# Patient Record
Sex: MALE | Race: ASIAN | NOT HISPANIC OR LATINO
[De-identification: names, ages, dates, MRNs, and addresses within clinical notes are randomized per-mention and may not be internally consistent; named-entity substitution may affect disease eponyms.]

---

## 2020-09-09 ENCOUNTER — APPOINTMENT (OUTPATIENT)
Dept: ORTHOPEDIC SURGERY | Facility: CLINIC | Age: 55
End: 2020-09-09
Payer: COMMERCIAL

## 2020-09-09 VITALS — BODY MASS INDEX: 26.6 KG/M2 | RESPIRATION RATE: 16 BRPM | WEIGHT: 190 LBS | HEIGHT: 71 IN

## 2020-09-09 DIAGNOSIS — Z86.79 PERSONAL HISTORY OF OTHER DISEASES OF THE CIRCULATORY SYSTEM: ICD-10-CM

## 2020-09-09 DIAGNOSIS — Z87.898 PERSONAL HISTORY OF OTHER SPECIFIED CONDITIONS: ICD-10-CM

## 2020-09-09 DIAGNOSIS — M75.22 BICIPITAL TENDINITIS, LEFT SHOULDER: ICD-10-CM

## 2020-09-09 DIAGNOSIS — Z87.39 PERSONAL HISTORY OF OTHER DISEASES OF THE MUSCULOSKELETAL SYSTEM AND CONNECTIVE TISSUE: ICD-10-CM

## 2020-09-09 DIAGNOSIS — Z87.891 PERSONAL HISTORY OF NICOTINE DEPENDENCE: ICD-10-CM

## 2020-09-09 DIAGNOSIS — M77.12 LATERAL EPICONDYLITIS, LEFT ELBOW: ICD-10-CM

## 2020-09-09 PROCEDURE — 99203 OFFICE O/P NEW LOW 30 MIN: CPT

## 2020-09-09 PROCEDURE — 73070 X-RAY EXAM OF ELBOW: CPT | Mod: LT

## 2020-09-09 RX ORDER — CHLORTHALIDONE 25 MG/1
25 TABLET ORAL
Refills: 0 | Status: ACTIVE | COMMUNITY

## 2020-09-09 RX ORDER — ATENOLOL 50 MG/1
TABLET ORAL
Refills: 0 | Status: ACTIVE | COMMUNITY

## 2020-09-09 RX ORDER — LOSARTAN POTASSIUM 100 MG/1
100 TABLET, FILM COATED ORAL
Refills: 0 | Status: ACTIVE | COMMUNITY

## 2020-11-05 ENCOUNTER — OUTPATIENT (OUTPATIENT)
Dept: OUTPATIENT SERVICES | Facility: HOSPITAL | Age: 55
LOS: 1 days | End: 2020-11-05
Payer: COMMERCIAL

## 2020-11-05 DIAGNOSIS — I25.10 ATHEROSCLEROTIC HEART DISEASE OF NATIVE CORONARY ARTERY WITHOUT ANGINA PECTORIS: ICD-10-CM

## 2020-11-05 PROCEDURE — 78452 HT MUSCLE IMAGE SPECT MULT: CPT | Mod: 26

## 2020-11-05 PROCEDURE — 93016 CV STRESS TEST SUPVJ ONLY: CPT

## 2020-11-05 PROCEDURE — 78452 HT MUSCLE IMAGE SPECT MULT: CPT

## 2020-11-05 PROCEDURE — A9500: CPT

## 2020-11-05 PROCEDURE — 93018 CV STRESS TEST I&R ONLY: CPT

## 2020-11-05 PROCEDURE — 93017 CV STRESS TEST TRACING ONLY: CPT

## 2020-11-05 PROCEDURE — A9505: CPT

## 2021-03-02 ENCOUNTER — APPOINTMENT (OUTPATIENT)
Dept: ENDOCRINOLOGY | Facility: CLINIC | Age: 56
End: 2021-03-02
Payer: COMMERCIAL

## 2021-03-02 VITALS
WEIGHT: 194 LBS | BODY MASS INDEX: 27.16 KG/M2 | DIASTOLIC BLOOD PRESSURE: 88 MMHG | HEART RATE: 59 BPM | SYSTOLIC BLOOD PRESSURE: 140 MMHG | HEIGHT: 71 IN

## 2021-03-02 DIAGNOSIS — E11.9 TYPE 2 DIABETES MELLITUS W/OUT COMPLICATIONS: ICD-10-CM

## 2021-03-02 PROCEDURE — 99072 ADDL SUPL MATRL&STAF TM PHE: CPT

## 2021-03-02 PROCEDURE — 99204 OFFICE O/P NEW MOD 45 MIN: CPT

## 2021-03-02 RX ORDER — CLOPIDOGREL 75 MG/1
TABLET, FILM COATED ORAL
Refills: 0 | Status: DISCONTINUED | COMMUNITY
End: 2021-03-02

## 2021-03-03 RX ORDER — ATORVASTATIN CALCIUM 40 MG/1
40 TABLET, FILM COATED ORAL
Refills: 0 | Status: DISCONTINUED | COMMUNITY
End: 2021-03-03

## 2021-03-03 RX ORDER — ATORVASTATIN CALCIUM 80 MG/1
80 TABLET, FILM COATED ORAL
Qty: 90 | Refills: 0 | Status: ACTIVE | COMMUNITY
Start: 2021-02-23

## 2021-03-03 RX ORDER — POTASSIUM CHLORIDE 1500 MG/1
20 TABLET, EXTENDED RELEASE ORAL
Qty: 90 | Refills: 0 | Status: ACTIVE | COMMUNITY
Start: 2021-01-11

## 2021-03-03 NOTE — REVIEW OF SYSTEMS
[Fatigue] : fatigue [Blurred Vision] : blurred vision [Nausea] : nausea [Pain/Numbness of Digits] : pain/numbness of digits [Polydipsia] : polydipsia [All other systems negative] : All other systems negative

## 2021-03-03 NOTE — ASSESSMENT
[FreeTextEntry1] : Diabetes, new diagnosis.  goal A1c < 7% (or < 6.5 would be even better)\par Potential complications of diabetes reviewed (blindness, kidney disease, nerve damage) and importance of glucose control discussed to prevent complications. Explained reduced beta cell reserve with diabetes and importance of lifestyle and glucose control in preserving beta cell function. Limit carb portions to one type of carb per meal and limit portion to half cup.  Carb guide given; if carb counting, then limit meals to 45g per meal. Avoid  juice or sugared beverages.  Verio meter given and demonstrated: test glucose once/day, alternating between fasting (am goal < 130) and bedtime/post dinner (goal < 170). Foot care discussed. \par Exercise goal 30 min/day.\par Start metformin 500mg bid, take with food \par Limit alcohol to two servings per week.\par ophtho referral given\par RTO 3 months\par

## 2021-03-03 NOTE — PHYSICAL EXAM
[Alert] : alert [Healthy Appearance] : healthy appearance [No Proptosis] : no proptosis [No Lid Lag] : no lid lag [Normal Hearing] : hearing was normal [No LAD] : no lymphadenopathy [Thyroid Not Enlarged] : the thyroid was not enlarged [Clear to Auscultation] : lungs were clear to auscultation bilaterally [Normal S1, S2] : normal S1 and S2 [Regular Rhythm] : with a regular rhythm [No Edema] : no peripheral edema [Pedal Pulses Normal] : the pedal pulses are present [Normal Sensation on Monofilament Testing] : normal sensation on monofilament testing of lower extremities [Normal Affect] : the affect was normal [Normal Mood] : the mood was normal [Foot Ulcers] : no foot ulcers [de-identified] : fingerstick 241, 4h pp (watermelon, grapes, banana and cereal, no milk) [de-identified] : no onychomycoses

## 2021-03-03 NOTE — CONSULT LETTER
[Dear  ___] : Dear  [unfilled], [Consult Letter:] : I had the pleasure of evaluating your patient, [unfilled]. [Please see my note below.] : Please see my note below. [Consult Closing:] : Thank you very much for allowing me to participate in the care of this patient.  If you have any questions, please do not hesitate to contact me. [Sincerely,] : Sincerely, [FreeTextEntry1] : Mr. Arrieta has new diagnosis of diabetes, precipitated by worsening lifestyle choices over the holidays. I recommended starting metformin 500mg bid, along with lifestyle modification to get his A1c to goal.  Since he is early in his diabetes diagnosis, I would like to aim for A1c < 6.5%, if possible.  I explained that he may need two agents for diabetes but since his lifestyle has room for improvement, I only started metformin for now, but if A1c is still  over 7.5% at next visit, will recommend another agent, probably GLP1 agonist or SGLT2 inhibitor, since these also have been shown to have cardiac benefit.\par  [FreeTextEntry3] : Melissa Toledo MD\par Division of Endocrinology\par [facility] Physician [facility]

## 2021-03-03 NOTE — REASON FOR VISIT
[Initial Evaluation] : an initial evaluation [DM Type 2] : DM Type 2 [FreeTextEntry2] : Dr Bacilio Rasmussen

## 2021-03-03 NOTE — HISTORY OF PRESENT ILLNESS
[FreeTextEntry1] : Last A1c was 8.0%.  In the summer, A1c was 6.3% and he was told that this was elevated, and after hard work and exercise, A1c reduced to 5.9% in the fall.   But then he went to [place] for the holidays, ate more dessert, less opportunity to exercise, and now A1c is 8%.  Mother has diabetes (diagnosed when she was 62), and CHF. \par Recently has been having dry mouth, alexandra in the mornings, and blurred vision intermittently and reduced concentration.\par After drinking alcohol, the next morning has nausea and cold sweats.  Some mornings feels burning feeling or clammy.\par no SOB or chest pain.\par \par PMH: CAD s/p stent 11/2019 (found after experiencing chest pain radiating to L arm while walking to work)\par \par Meds:\par losartan 100mg, chlorthalidone 25mg, atenolol 12.5mg\par Klorcon\par atorvastatin 40mg (? or 80)\par aspirin 81mg

## 2021-03-15 ENCOUNTER — APPOINTMENT (OUTPATIENT)
Dept: OPHTHALMOLOGY | Facility: CLINIC | Age: 56
End: 2021-03-15
Payer: COMMERCIAL

## 2021-03-15 ENCOUNTER — NON-APPOINTMENT (OUTPATIENT)
Age: 56
End: 2021-03-15

## 2021-03-15 PROCEDURE — 92004 COMPRE OPH EXAM NEW PT 1/>: CPT

## 2021-03-15 PROCEDURE — 99072 ADDL SUPL MATRL&STAF TM PHE: CPT

## 2021-03-15 PROCEDURE — 92134 CPTRZ OPH DX IMG PST SGM RTA: CPT

## 2021-03-15 PROCEDURE — 92201 OPSCPY EXTND RTA DRAW UNI/BI: CPT

## 2021-03-30 ENCOUNTER — NON-APPOINTMENT (OUTPATIENT)
Age: 56
End: 2021-03-30

## 2021-05-07 ENCOUNTER — NON-APPOINTMENT (OUTPATIENT)
Age: 56
End: 2021-05-07

## 2021-05-07 ENCOUNTER — APPOINTMENT (OUTPATIENT)
Dept: OPHTHALMOLOGY | Facility: CLINIC | Age: 56
End: 2021-05-07
Payer: SELF-PAY

## 2021-05-07 PROCEDURE — 92015 DETERMINE REFRACTIVE STATE: CPT

## 2021-06-08 ENCOUNTER — LABORATORY RESULT (OUTPATIENT)
Age: 56
End: 2021-06-08

## 2021-06-08 ENCOUNTER — APPOINTMENT (OUTPATIENT)
Dept: ENDOCRINOLOGY | Facility: CLINIC | Age: 56
End: 2021-06-08
Payer: COMMERCIAL

## 2021-06-08 VITALS
BODY MASS INDEX: 26.08 KG/M2 | HEART RATE: 64 BPM | DIASTOLIC BLOOD PRESSURE: 84 MMHG | SYSTOLIC BLOOD PRESSURE: 128 MMHG | WEIGHT: 187 LBS

## 2021-06-08 PROCEDURE — 99214 OFFICE O/P EST MOD 30 MIN: CPT

## 2021-06-08 PROCEDURE — 99072 ADDL SUPL MATRL&STAF TM PHE: CPT

## 2021-06-08 NOTE — ASSESSMENT
[FreeTextEntry1] : Diabetes, sugars improved.  goal A1c < 7% (or < 6.5 would be even better).  Mixed hyperlipidemia.\par Check A1c today, if still over 7.5%, will either increase metformin or add SGLT2 inhibitor, which also has cardiac benefits, and he has CAD.\par Continue lifestyle changes. OK to have dried fruits, but should be mindful of portion sizes.  Try to increase exercise to 5x/week\par continue statin therapy.  recheck lipids today (high trigs last visit).\par RTO 4  months\par

## 2021-06-08 NOTE — PHYSICAL EXAM
[Alert] : alert [Healthy Appearance] : healthy appearance [No Proptosis] : no proptosis [No Lid Lag] : no lid lag [Normal Hearing] : hearing was normal [No LAD] : no lymphadenopathy [Thyroid Not Enlarged] : the thyroid was not enlarged [Clear to Auscultation] : lungs were clear to auscultation bilaterally [Normal S1, S2] : normal S1 and S2 [Regular Rhythm] : with a regular rhythm [No Edema] : no peripheral edema [Pedal Pulses Normal] : the pedal pulses are present [Normal Sensation on Monofilament Testing] : normal sensation on monofilament testing of lower extremities [Normal Affect] : the affect was normal [Normal Mood] : the mood was normal [Foot Ulcers] : no foot ulcers [de-identified] : no onychomycoses

## 2021-06-08 NOTE — HISTORY OF PRESENT ILLNESS
[FreeTextEntry1] : glucose log reviewed:  testing once/day\par am  104 -160, but mostly 130-150 range\par hs:  155, 183, 126, 135, 206\par no polyuria, polydipsia, SOB, chest pain, or neuropathy symptoms \par saw ophtho, 3/21 and 5/21, no DR\par walking every day for errands, shopping, but has dedicated walk 2-3x/week\par eats an occasional pastry.  has dried fruit in the morning.  ate ice cream once, and then sugar was high the next morning.\par stopped drinking alcohol for a while, and recently had one drink\par weight is down 7lb since last visit\par fully vaccinated for Covid\par \par PMH: CAD s/p stent 11/2019 (found after experiencing chest pain radiating to L arm while walking to work)\par \par Meds:\par metformin 500mg bid\par losartan 100mg, chlorthalidone 25mg, atenolol 12.5mg\par Klorcon\par atorvastatin 40mg (? or 80)\par aspirin 81mg

## 2021-06-10 ENCOUNTER — TRANSCRIPTION ENCOUNTER (OUTPATIENT)
Age: 56
End: 2021-06-10

## 2021-08-23 ENCOUNTER — RX RENEWAL (OUTPATIENT)
Age: 56
End: 2021-08-23

## 2021-09-03 ENCOUNTER — RX RENEWAL (OUTPATIENT)
Age: 56
End: 2021-09-03

## 2021-10-07 ENCOUNTER — APPOINTMENT (OUTPATIENT)
Dept: ENDOCRINOLOGY | Facility: CLINIC | Age: 56
End: 2021-10-07
Payer: COMMERCIAL

## 2021-10-07 ENCOUNTER — APPOINTMENT (OUTPATIENT)
Dept: BARIATRICS | Facility: CLINIC | Age: 56
End: 2021-10-07
Payer: COMMERCIAL

## 2021-10-07 VITALS
SYSTOLIC BLOOD PRESSURE: 125 MMHG | BODY MASS INDEX: 25.66 KG/M2 | WEIGHT: 184 LBS | DIASTOLIC BLOOD PRESSURE: 74 MMHG | HEART RATE: 60 BPM

## 2021-10-07 VITALS
DIASTOLIC BLOOD PRESSURE: 84 MMHG | WEIGHT: 187 LBS | OXYGEN SATURATION: 97 % | BODY MASS INDEX: 26.18 KG/M2 | SYSTOLIC BLOOD PRESSURE: 134 MMHG | HEART RATE: 61 BPM | TEMPERATURE: 97.6 F | HEIGHT: 71 IN

## 2021-10-07 DIAGNOSIS — Z82.49 FAMILY HISTORY OF ISCHEMIC HEART DISEASE AND OTHER DISEASES OF THE CIRCULATORY SYSTEM: ICD-10-CM

## 2021-10-07 DIAGNOSIS — Z83.3 FAMILY HISTORY OF DIABETES MELLITUS: ICD-10-CM

## 2021-10-07 PROCEDURE — 99214 OFFICE O/P EST MOD 30 MIN: CPT

## 2021-10-07 PROCEDURE — 99204 OFFICE O/P NEW MOD 45 MIN: CPT

## 2021-10-07 RX ORDER — HYDROCORTISONE 1 %
12 CREAM (GRAM) TOPICAL
Qty: 1 | Refills: 3 | Status: ACTIVE | COMMUNITY
Start: 2021-10-07 | End: 1900-01-01

## 2021-10-07 NOTE — ASSESSMENT
[FreeTextEntry1] : Diabetes,  at  goal  (A1c < 7.0%)   \par Continue metformin monotherapy.  If A1c not at goal, can increase metformin or add SGLT2 inhibitor (since he has CAD also).\par OK to test glucose less frequently; I suggested testing 3x/week, but alternate times between fasting and bedtime (post dinner). \par Continue statin therapy.\par Try ammonium lactate for dry skin\par He will get labs with PCP later this month and forward to me.\par RTO 6 months.  If A1c still < 6.5%, can follow with PCP and return to me if A1c goes over 7.

## 2021-10-07 NOTE — HISTORY OF PRESENT ILLNESS
[FreeTextEntry1] : glucose log reviewed:  testing less frequently, mostly in the mornings\par am  115-161, mostly 135-150 range\par hs:  189, 1436\par no polyuria, polydipsia, SOB, chest pain, or neuropathy symptoms \par went to  for over a month in the summer, was off usual diet.   weight is down another 3 lb since last visit.\par main issue is dry skin on lower legs, feet. sometimes hands\par Was having some stomach upset, was diagnosed with umbilical hernia and referred to surgery\par fully vaccinated for Covid; declines flu vaccine\par up to date with ophtho, has follow up later this month\par \par PMH: CAD s/p stent 11/2019 (found after experiencing chest pain radiating to L arm while walking to work)\par \par Meds:\par metformin 500mg bid\par losartan 100mg, chlorthalidone 25mg, atenolol 12.5mg\par Klorcon\par atorvastatin 40mg (? or 80)\par aspirin 81mg

## 2021-10-07 NOTE — DATA REVIEWED
[FreeTextEntry1] : 6/21: A1c 5.7%, tot chol 115, trig 120, HDL 40, LDL 52,  B12 521, urine microalbumin < 1.2\par 2/21: A1c 8.0%, tot chol 122, HDL 32, LDL 54, trig 222, TSH 2.31, GFR 74

## 2021-10-07 NOTE — PHYSICAL EXAM
[Alert] : alert [Healthy Appearance] : healthy appearance [No Proptosis] : no proptosis [No Lid Lag] : no lid lag [Normal Hearing] : hearing was normal [No LAD] : no lymphadenopathy [Thyroid Not Enlarged] : the thyroid was not enlarged [Clear to Auscultation] : lungs were clear to auscultation bilaterally [Normal S1, S2] : normal S1 and S2 [Regular Rhythm] : with a regular rhythm [No Edema] : no peripheral edema [Pedal Pulses Normal] : the pedal pulses are present [Normal Sensation on Monofilament Testing] : normal sensation on monofilament testing of lower extremities [Normal Affect] : the affect was normal [Normal Mood] : the mood was normal [Foot Ulcers] : no foot ulcers [de-identified] : no onychomycoses

## 2021-10-12 PROBLEM — Z82.49 FAMILY HISTORY OF CARDIAC DISORDER: Status: ACTIVE | Noted: 2021-10-07

## 2021-10-12 PROBLEM — Z83.3 FAMILY HISTORY OF DIABETES MELLITUS: Status: ACTIVE | Noted: 2021-10-07

## 2021-10-12 PROBLEM — Z82.49 FAMILY HISTORY OF HYPERTENSION: Status: ACTIVE | Noted: 2021-10-07

## 2021-10-21 ENCOUNTER — OUTPATIENT (OUTPATIENT)
Dept: OUTPATIENT SERVICES | Facility: HOSPITAL | Age: 56
LOS: 1 days | End: 2021-10-21
Payer: COMMERCIAL

## 2021-10-21 PROCEDURE — 71046 X-RAY EXAM CHEST 2 VIEWS: CPT

## 2021-10-21 PROCEDURE — 71046 X-RAY EXAM CHEST 2 VIEWS: CPT | Mod: 26

## 2021-10-27 ENCOUNTER — APPOINTMENT (OUTPATIENT)
Dept: OPHTHALMOLOGY | Facility: CLINIC | Age: 56
End: 2021-10-27
Payer: COMMERCIAL

## 2021-10-27 ENCOUNTER — NON-APPOINTMENT (OUTPATIENT)
Age: 56
End: 2021-10-27

## 2021-10-27 PROCEDURE — 92250 FUNDUS PHOTOGRAPHY W/I&R: CPT

## 2021-10-27 PROCEDURE — 92014 COMPRE OPH EXAM EST PT 1/>: CPT

## 2021-11-04 ENCOUNTER — TRANSCRIPTION ENCOUNTER (OUTPATIENT)
Age: 56
End: 2021-11-04

## 2021-11-05 ENCOUNTER — OUTPATIENT (OUTPATIENT)
Dept: OUTPATIENT SERVICES | Facility: HOSPITAL | Age: 56
LOS: 1 days | Discharge: HOME | End: 2021-11-05
Payer: COMMERCIAL

## 2021-11-05 ENCOUNTER — APPOINTMENT (OUTPATIENT)
Dept: SURGERY | Facility: AMBULATORY SURGERY CENTER | Age: 56
End: 2021-11-05

## 2021-11-05 DIAGNOSIS — K46.9 UNSPECIFIED ABDOMINAL HERNIA W/OUT OBSTRUCTION OR GANGRENE: ICD-10-CM

## 2021-11-05 DIAGNOSIS — Z00.00 ENCOUNTER FOR GENERAL ADULT MEDICAL EXAMINATION W/OUT ABNORMAL FINDINGS: ICD-10-CM

## 2021-11-05 LAB — GLUCOSE BLDC GLUCOMTR-MCNC: 176 MG/DL — HIGH (ref 70–99)

## 2021-11-05 PROCEDURE — 49561: CPT

## 2021-11-05 PROCEDURE — 49568: CPT

## 2021-11-11 ENCOUNTER — APPOINTMENT (OUTPATIENT)
Dept: SURGERY | Facility: CLINIC | Age: 56
End: 2021-11-11

## 2021-11-11 VITALS
HEIGHT: 71 IN | HEART RATE: 67 BPM | SYSTOLIC BLOOD PRESSURE: 124 MMHG | WEIGHT: 187 LBS | OXYGEN SATURATION: 98 % | DIASTOLIC BLOOD PRESSURE: 78 MMHG | BODY MASS INDEX: 26.18 KG/M2 | TEMPERATURE: 97.6 F

## 2021-11-22 ENCOUNTER — NON-APPOINTMENT (OUTPATIENT)
Age: 56
End: 2021-11-22

## 2021-11-22 ENCOUNTER — APPOINTMENT (OUTPATIENT)
Dept: OPHTHALMOLOGY | Facility: CLINIC | Age: 56
End: 2021-11-22
Payer: COMMERCIAL

## 2021-11-22 PROCEDURE — 67105 REPAIR DETACHED RETINA PC: CPT | Mod: LT

## 2021-12-06 ENCOUNTER — NON-APPOINTMENT (OUTPATIENT)
Age: 56
End: 2021-12-06

## 2021-12-06 ENCOUNTER — APPOINTMENT (OUTPATIENT)
Dept: OPHTHALMOLOGY | Facility: CLINIC | Age: 56
End: 2021-12-06
Payer: COMMERCIAL

## 2021-12-06 PROCEDURE — 92012 INTRM OPH EXAM EST PATIENT: CPT

## 2021-12-06 PROCEDURE — 92201 OPSCPY EXTND RTA DRAW UNI/BI: CPT

## 2022-04-07 ENCOUNTER — APPOINTMENT (OUTPATIENT)
Dept: OPHTHALMOLOGY | Facility: CLINIC | Age: 57
End: 2022-04-07
Payer: COMMERCIAL

## 2022-04-07 ENCOUNTER — NON-APPOINTMENT (OUTPATIENT)
Age: 57
End: 2022-04-07

## 2022-04-07 PROCEDURE — 92250 FUNDUS PHOTOGRAPHY W/I&R: CPT

## 2022-04-07 PROCEDURE — 92014 COMPRE OPH EXAM EST PT 1/>: CPT

## 2022-05-17 ENCOUNTER — RX RENEWAL (OUTPATIENT)
Age: 57
End: 2022-05-17

## 2022-08-15 ENCOUNTER — RX RENEWAL (OUTPATIENT)
Age: 57
End: 2022-08-15

## 2022-08-26 ENCOUNTER — RX RENEWAL (OUTPATIENT)
Age: 57
End: 2022-08-26

## 2022-09-08 ENCOUNTER — APPOINTMENT (OUTPATIENT)
Dept: OPHTHALMOLOGY | Facility: CLINIC | Age: 57
End: 2022-09-08

## 2022-11-17 ENCOUNTER — RX RENEWAL (OUTPATIENT)
Age: 57
End: 2022-11-17

## 2022-12-08 ENCOUNTER — APPOINTMENT (OUTPATIENT)
Dept: PULMONOLOGY | Facility: CLINIC | Age: 57
End: 2022-12-08

## 2022-12-08 VITALS
HEART RATE: 67 BPM | OXYGEN SATURATION: 98 % | TEMPERATURE: 97.1 F | BODY MASS INDEX: 27.44 KG/M2 | DIASTOLIC BLOOD PRESSURE: 79 MMHG | SYSTOLIC BLOOD PRESSURE: 123 MMHG | WEIGHT: 196 LBS | HEIGHT: 71 IN

## 2022-12-08 DIAGNOSIS — R00.2 PALPITATIONS: ICD-10-CM

## 2022-12-08 PROCEDURE — 99203 OFFICE O/P NEW LOW 30 MIN: CPT

## 2022-12-08 NOTE — PHYSICAL EXAM
[General Appearance - In No Acute Distress] : no acute distress [Normal Oropharynx] : normal oropharynx [Neck Appearance] : the appearance of the neck was normal [Apical Impulse] : the apical impulse was normal [Heart Sounds] : normal S1 and S2 [] : no respiratory distress [Exaggerated Use Of Accessory Muscles For Inspiration] : no accessory muscle use [Abnormal Walk] : normal gait [Involuntary Movements] : no involuntary movements were seen [No Focal Deficits] : no focal deficits [Oriented To Time, Place, And Person] : oriented to person, place, and time [Affect] : the affect was normal

## 2022-12-09 PROBLEM — R00.2 HEART PALPITATIONS: Status: ACTIVE | Noted: 2022-12-08

## 2022-12-09 NOTE — HISTORY OF PRESENT ILLNESS
[FreeTextEntry1] : 12/08/2022 :  MARTA CARREON is a 57 year old male  former smoker (quit 2007, 1PPD x 20 years) with PMHx CAD s/p PCI 2019, DM who is here for initial evaluation for possible sleep apnea. \par \par Few weeks ago he experienced palpitations and had a full cardiac w/u  including echo and holter monitor which was nml and then he was recommended to have a  sleep study. \par \par He reports snoring but unsure about witnessed apnea. He denies dozing off and morning HA. he gained 8lb x 6 months. \par \par Sleep Routine:\par \par He goes to bed at 11p, sleep latency is about 10 min, he wakes up once, WASO is about 5 min and then he is up at  7 AM. He naps once/week for 1 hour . Mild excessive daytime somnolence with [place] sleepiness scale (out of 24 points) = 7.\par \par Denies morning headache, parasomnia, restless legs, leg movements, cataplexy or sleep paralysis.

## 2022-12-09 NOTE — ASSESSMENT
[FreeTextEntry1] : 58 y/o M with c/o palpations and snoring with negative cardiac w/u who is here for further management and evaluation of sleep apnea.  He will be traveling abroad and will prefer to have the sleep study after his return.

## 2022-12-09 NOTE — REVIEW OF SYSTEMS
[Recent Wt Gain (___ Lbs)] : recent [unfilled] ~Ulb weight gain [Snoring] : snoring [Palpitations] : palpitations [Diabetes] : diabetes  [Negative] : Psychiatric [FreeTextEntry9] : CAD s/p PCI

## 2023-02-13 ENCOUNTER — RX RENEWAL (OUTPATIENT)
Age: 58
End: 2023-02-13

## 2023-02-24 ENCOUNTER — APPOINTMENT (OUTPATIENT)
Dept: ENDOCRINOLOGY | Facility: CLINIC | Age: 58
End: 2023-02-24
Payer: COMMERCIAL

## 2023-02-24 VITALS
HEART RATE: 62 BPM | SYSTOLIC BLOOD PRESSURE: 126 MMHG | DIASTOLIC BLOOD PRESSURE: 74 MMHG | BODY MASS INDEX: 26.64 KG/M2 | WEIGHT: 191 LBS

## 2023-02-24 PROCEDURE — 99214 OFFICE O/P EST MOD 30 MIN: CPT

## 2023-02-24 RX ORDER — OXYCODONE AND ACETAMINOPHEN 5; 325 MG/1; MG/1
5-325 TABLET ORAL
Qty: 20 | Refills: 0 | Status: DISCONTINUED | COMMUNITY
Start: 2021-11-05 | End: 2023-02-24

## 2023-02-24 RX ORDER — LANCETS 33 GAUGE
EACH MISCELLANEOUS DAILY
Qty: 100 | Refills: 3 | Status: ACTIVE | COMMUNITY
Start: 2021-03-02 | End: 1900-01-01

## 2023-02-24 NOTE — HISTORY OF PRESENT ILLNESS
[FreeTextEntry1] : Morning sugars are high, up to 170.   Then a few hours later, glucose usually < 120\par He went to ED 2 days ago because he wasn't feeling well, BP was high.  A1c was 6.5%\par Since last visit, he had umbilical hernia repair.\par Legs get itchy and then take longer to heal.\par Main exercise is walking\par no chest pain or SOB\par last ophtho visit, 4/22:  no DR\par labs reviewed from [facility] portal, 1/23: A1c 6.5%\par \par PMH: CAD s/p stent 11/2019 (found after experiencing chest pain radiating to L arm while walking to work)\par \par Meds:\par metformin 500mg bid\par losartan 100mg, chlorthalidone 25mg, atenolol 12.5mg\par Klorcon\par atorvastatin 40mg (? or 80)\par aspirin 81mg

## 2023-02-24 NOTE — PHYSICAL EXAM
[Alert] : alert [Healthy Appearance] : healthy appearance [No Proptosis] : no proptosis [No Lid Lag] : no lid lag [Normal Hearing] : hearing was normal [No LAD] : no lymphadenopathy [Thyroid Not Enlarged] : the thyroid was not enlarged [Clear to Auscultation] : lungs were clear to auscultation bilaterally [Normal S1, S2] : normal S1 and S2 [Regular Rhythm] : with a regular rhythm [No Edema] : no peripheral edema [Pedal Pulses Normal] : the pedal pulses are present [Normal Sensation on Monofilament Testing] : normal sensation on monofilament testing of lower extremities [Normal Affect] : the affect was normal [Normal Mood] : the mood was normal [Foot Ulcers] : no foot ulcers [de-identified] : no onychomycoses

## 2023-02-24 NOTE — ASSESSMENT
[FreeTextEntry1] : Diabetes, at goal (goal A1c < 7% (or < 6.5 would be even better).  Mixed hyperlipidemia.\par continue metformin monotherapy.  Since A1c is < 7% and BMI < 30, I don't think he needs to add more medication at this time.  Discussed declining insulin reserve with longer duration of diabetes such that he may eventually need more medication in the future, but not at this time.\par continue statin therapy. \par due for urine albumin\par RTO 1 year\par

## 2023-02-24 NOTE — DATA REVIEWED
[FreeTextEntry1] : 1/23  A1c 6.5%, tot chol 201, trig 260, HDL 41, , TSH 1.29, 25D 26.8\par 11/22  A1c 6.8%, tot chol 202, trig 312, HDL 40, LDL 99, TSH 1.46, B12 532\par 7/22  A1c 6.2%\par 10/21 A1c 5.6%, tot chol 107,  HDL 39,  LDL 53, trig 125, TSH 1.73\par 6/21: A1c 5.7%, tot chol 115, trig 120, HDL 40, LDL 52,  B12 521, urine microalbumin < 1.2\par 2/21: A1c 8.0%, tot chol 122, HDL 32, LDL 54, trig 222, TSH 2.31, GFR 74

## 2023-03-27 ENCOUNTER — APPOINTMENT (OUTPATIENT)
Dept: SLEEP CENTER | Facility: HOME HEALTH | Age: 58
End: 2023-03-27
Payer: COMMERCIAL

## 2023-03-27 ENCOUNTER — OUTPATIENT (OUTPATIENT)
Dept: OUTPATIENT SERVICES | Facility: HOSPITAL | Age: 58
LOS: 1 days | End: 2023-03-27
Payer: COMMERCIAL

## 2023-03-27 DIAGNOSIS — G47.33 OBSTRUCTIVE SLEEP APNEA (ADULT) (PEDIATRIC): ICD-10-CM

## 2023-03-27 PROCEDURE — 95800 SLP STDY UNATTENDED: CPT | Mod: 26

## 2023-03-27 PROCEDURE — 95800 SLP STDY UNATTENDED: CPT

## 2023-03-31 ENCOUNTER — RX RENEWAL (OUTPATIENT)
Age: 58
End: 2023-03-31

## 2023-04-25 ENCOUNTER — APPOINTMENT (OUTPATIENT)
Dept: PULMONOLOGY | Facility: CLINIC | Age: 58
End: 2023-04-25
Payer: COMMERCIAL

## 2023-04-25 DIAGNOSIS — R06.83 SNORING: ICD-10-CM

## 2023-04-25 DIAGNOSIS — G47.33 OBSTRUCTIVE SLEEP APNEA (ADULT) (PEDIATRIC): ICD-10-CM

## 2023-04-25 PROCEDURE — 99443: CPT

## 2023-04-25 NOTE — HISTORY OF PRESENT ILLNESS
[Home] : at home, [unfilled] , at the time of the visit. [Medical Office: ([facility])___] : at the medical office located in  [Verbal consent obtained from patient] : the patient, [unfilled] [FreeTextEntry1] : 4/25/23: Telephone visit.  Home sleep study from March 27, 2023 reviewed with patient.  This was a fairly typical night for him subjectively.  He had mild to moderate sleep disordered breathing with apnea-hypopnea index 14.6 (4%).  He has had palpitations with atrial and ventricular premature beats noted in the past, but not recently. No significant medical history or changes in medication since last visit.

## 2023-04-25 NOTE — ASSESSMENT
[FreeTextEntry1] : sleep disordered breathing, mild to moderate\par \par Treatment options for sleep disordered breathing were discussed.  The most rapid and successful treatment remains nasal CPAP or BilevelPAP.  Alternatives include upper airway surgery such as uvulopharyngoplasty or a dental appliance (better for milder cases).  Recently hypoglossal nerve stimulation has been used.  Positional therapy (avoidance of supine posture) can be helpful, and all patients should try to maintain a healthy weight and avoid alcohol or other sedating medications close to bedtime \par \par Treatment will be ordered with autotitrating CPAP, which will be downloaded after a few weeks of use to check compliance and optimize pressure based on efficacy.  If not comfortable with this treatment, polysomnography with CPAP titration may be needed. Discussed choice of interface, cleaning, humidifier water, adjusting to CPAP, insurance rules for monitoring usage.\par \par Follow up after one month of use or earlier if any problems.

## 2023-05-03 ENCOUNTER — APPOINTMENT (OUTPATIENT)
Dept: OPHTHALMOLOGY | Facility: CLINIC | Age: 58
End: 2023-05-03
Payer: COMMERCIAL

## 2023-05-03 ENCOUNTER — NON-APPOINTMENT (OUTPATIENT)
Age: 58
End: 2023-05-03

## 2023-05-03 PROCEDURE — 92201 OPSCPY EXTND RTA DRAW UNI/BI: CPT

## 2023-05-03 PROCEDURE — 92134 CPTRZ OPH DX IMG PST SGM RTA: CPT

## 2023-05-03 PROCEDURE — 92014 COMPRE OPH EXAM EST PT 1/>: CPT

## 2023-10-30 ENCOUNTER — RX RENEWAL (OUTPATIENT)
Age: 58
End: 2023-10-30

## 2023-12-31 ENCOUNTER — RX RENEWAL (OUTPATIENT)
Age: 58
End: 2023-12-31

## 2023-12-31 RX ORDER — BLOOD SUGAR DIAGNOSTIC
STRIP MISCELLANEOUS DAILY
Qty: 50 | Refills: 3 | Status: ACTIVE | COMMUNITY
Start: 2021-03-02 | End: 1900-01-01

## 2024-01-19 ENCOUNTER — APPOINTMENT (OUTPATIENT)
Dept: OPHTHALMOLOGY | Facility: CLINIC | Age: 59
End: 2024-01-19
Payer: COMMERCIAL

## 2024-01-19 ENCOUNTER — NON-APPOINTMENT (OUTPATIENT)
Age: 59
End: 2024-01-19

## 2024-01-19 PROCEDURE — 92134 CPTRZ OPH DX IMG PST SGM RTA: CPT

## 2024-01-19 PROCEDURE — 92014 COMPRE OPH EXAM EST PT 1/>: CPT

## 2024-01-19 PROCEDURE — 92201 OPSCPY EXTND RTA DRAW UNI/BI: CPT

## 2024-01-25 ENCOUNTER — RX RENEWAL (OUTPATIENT)
Age: 59
End: 2024-01-25

## 2024-02-12 ENCOUNTER — OUTPATIENT (OUTPATIENT)
Dept: OUTPATIENT SERVICES | Facility: HOSPITAL | Age: 59
LOS: 1 days | End: 2024-02-12
Payer: COMMERCIAL

## 2024-02-12 ENCOUNTER — APPOINTMENT (OUTPATIENT)
Dept: RADIOLOGY | Facility: CLINIC | Age: 59
End: 2024-02-12

## 2024-02-12 PROCEDURE — 73521 X-RAY EXAM HIPS BI 2 VIEWS: CPT | Mod: 26

## 2024-04-18 ENCOUNTER — APPOINTMENT (OUTPATIENT)
Dept: ENDOCRINOLOGY | Facility: CLINIC | Age: 59
End: 2024-04-18
Payer: COMMERCIAL

## 2024-04-18 VITALS
DIASTOLIC BLOOD PRESSURE: 79 MMHG | SYSTOLIC BLOOD PRESSURE: 149 MMHG | HEART RATE: 87 BPM | BODY MASS INDEX: 26.92 KG/M2 | WEIGHT: 193 LBS

## 2024-04-18 LAB
GLUCOSE BLDC GLUCOMTR-MCNC: 280
HBA1C MFR BLD HPLC: 8.5

## 2024-04-18 PROCEDURE — 99214 OFFICE O/P EST MOD 30 MIN: CPT

## 2024-04-18 PROCEDURE — 83036 HEMOGLOBIN GLYCOSYLATED A1C: CPT | Mod: QW

## 2024-04-18 PROCEDURE — 82962 GLUCOSE BLOOD TEST: CPT

## 2024-04-18 PROCEDURE — G2211 COMPLEX E/M VISIT ADD ON: CPT | Mod: NC,1L

## 2024-04-18 RX ORDER — METFORMIN HYDROCHLORIDE 850 MG/1
850 TABLET, COATED ORAL TWICE DAILY
Qty: 180 | Refills: 3 | Status: ACTIVE | COMMUNITY
Start: 2021-03-02 | End: 1900-01-01

## 2024-04-18 NOTE — DATA REVIEWED
[FreeTextEntry1] : 4/25  A1c 8.5% 1/24  A1c 8.1%  tot chol 105, trig 127, HDL 46, LDL 37, TSH 1.55 6/23  A1c 6.4%  tot chol 120, trig 351, HDL 38, LDL 12 1/23  A1c 6.5%, tot chol 201, trig 260, HDL 41, , TSH 1.29, 25D 26.8 11/22  A1c 6.8%, tot chol 202, trig 312, HDL 40, LDL 99, TSH 1.46, B12 532 7/22  A1c 6.2% 10/21 A1c 5.6%, tot chol 107,  HDL 39,  LDL 53, trig 125, TSH 1.73 6/21: A1c 5.7%, tot chol 115, trig 120, HDL 40, LDL 52,  B12 521, urine microalbumin < 1.2 2/21: A1c 8.0%, tot chol 122, HDL 32, LDL 54, trig 222, TSH 2.31, GFR 74

## 2024-04-18 NOTE — HISTORY OF PRESENT ILLNESS
[FreeTextEntry1] : He admits he went crazy over the holidays, eating more dessert and drinking more than usual. He wasn't feeling good in January, went to [facility] ED.  Glucose was 270 and A1c was 7.5%.  Vision was blurry so head was imaged and was negative.  He is still walking but if he does more strenuous exercise or strength exercises, he gets more fatigued later that day. no chest pain or SOB.  He has some tingling in his toes occasionally. last ophtho visit, 5/23:  no DR labs reviewed from [facility] portal,  3/24:  A1c 8.2%  LDL 37,  TSH 1.55 A1c today is 8.5%  PMH: CAD s/p stent 11/2019 (found after experiencing chest pain radiating to L arm while walking to work)  Meds: metformin 500mg bid losartan 100mg, chlorthalidone 25mg, atenolol 12.5mg Klorcon atorvastatin 40mg (? or 80) aspirin 81mg

## 2024-04-18 NOTE — ASSESSMENT
[FreeTextEntry1] : Diabetes, not controlled (goal A1c < 7% (or < 6.5 would be even better).  Mixed hyperlipidemia. Discussed declining insulin reserve and he is at the point where second agent is needed. Titrate metformin to 850mg bid and will add Jardiance 10mg/day, which also has cardiac benefit.  Potential side effects  discussed: increased urination, increased risk of urinary/genital infection, low BP, dizziness. Advised to drink extra water (avoid dehydration) and empty bladder frequently. Sample given for Jardiance 25mg (#7), pt should start with half tab/day of this sample. Restart home glucose testing.  Test 3-4x/day, varying times of testing.  Pre meal goal < 140 and 2-3 h post meal goal < 180.  If sugar is over 250 after a meal, he should take note of which foods cause this increase in sugar.  For these meals, I can add repaglinide, if there is a clear pattern to when sugars are spiking. continue statin therapy.   RTO 4 months

## 2024-04-18 NOTE — PHYSICAL EXAM
[Alert] : alert [Healthy Appearance] : healthy appearance [No Proptosis] : no proptosis [No Lid Lag] : no lid lag [Normal Hearing] : hearing was normal [No LAD] : no lymphadenopathy [Thyroid Not Enlarged] : the thyroid was not enlarged [Clear to Auscultation] : lungs were clear to auscultation bilaterally [Normal S1, S2] : normal S1 and S2 [Regular Rhythm] : with a regular rhythm [No Edema] : no peripheral edema [Pedal Pulses Normal] : the pedal pulses are present [Acanthosis Nigricans] : no acanthosis nigricans [Foot Ulcers] : no foot ulcers [Normal Sensation on Monofilament Testing] : normal sensation on monofilament testing of lower extremities [Normal Affect] : the affect was normal [Normal Mood] : the mood was normal [de-identified] : glucose 280 [de-identified] : no onychomycoses

## 2024-04-25 ENCOUNTER — RX RENEWAL (OUTPATIENT)
Age: 59
End: 2024-04-25

## 2024-04-25 RX ORDER — METFORMIN HYDROCHLORIDE 500 MG/1
500 TABLET, COATED ORAL
Qty: 180 | Refills: 0 | Status: ACTIVE | COMMUNITY
Start: 2024-04-25 | End: 1900-01-01

## 2024-05-01 ENCOUNTER — APPOINTMENT (OUTPATIENT)
Dept: OPHTHALMOLOGY | Facility: CLINIC | Age: 59
End: 2024-05-01

## 2024-05-06 ENCOUNTER — NON-APPOINTMENT (OUTPATIENT)
Age: 59
End: 2024-05-06

## 2024-05-06 RX ORDER — PIOGLITAZONE HYDROCHLORIDE 30 MG/1
30 TABLET ORAL
Qty: 90 | Refills: 1 | Status: ACTIVE | COMMUNITY
Start: 2024-05-06 | End: 1900-01-01

## 2024-05-06 RX ORDER — EMPAGLIFLOZIN 10 MG/1
10 TABLET, FILM COATED ORAL
Qty: 90 | Refills: 1 | Status: DISCONTINUED | COMMUNITY
Start: 2024-04-18 | End: 2024-05-06

## 2024-05-13 ENCOUNTER — APPOINTMENT (OUTPATIENT)
Dept: OTOLARYNGOLOGY | Facility: CLINIC | Age: 59
End: 2024-05-13
Payer: COMMERCIAL

## 2024-05-13 DIAGNOSIS — Z78.9 OTHER SPECIFIED HEALTH STATUS: ICD-10-CM

## 2024-05-13 DIAGNOSIS — J02.9 ACUTE PHARYNGITIS, UNSPECIFIED: ICD-10-CM

## 2024-05-13 PROCEDURE — 31575 DIAGNOSTIC LARYNGOSCOPY: CPT

## 2024-05-13 PROCEDURE — 99203 OFFICE O/P NEW LOW 30 MIN: CPT | Mod: 25

## 2024-05-14 PROBLEM — J02.9 SORE THROAT: Status: ACTIVE | Noted: 2024-05-14 | Resolved: 2024-06-13

## 2024-05-14 NOTE — PROCEDURE
[de-identified] : PROCEDURE: Flexible laryngoscopy SURGEON: Dr. Arora INDICATIONS: He was unable to tolerate a mirror exam. Assess for laryngopharynx pharyngeal reflux. cough. head and neck mass. Verbal Consent obtained. ANESTHESIA: The patient was placed in a sitting position.  Following application of the topical anesthetic and decongestant, exam was performed with a flexible scope.  The scope was passed along the right nasal floor to the nasopharynx.  It was then passed into the region of the middle meatus, middle turbinate, and sphenoethmoid region.  An identical procedure was performed on the left side.  The following findings were noted:   The nasal musoca was healthy appearing and the septum was roughly midline. The middle meatus and sphenoethmoid recesses were clear bilaterally. The nasopharynx   Nasopharynx: no masses, choanae patent, no adenoid tissue   Base of tongue/vallecula: no masses or asymmetry Pharyngeal walls: symmetrical. No masses. Pyriform sinuses: no lesions or pooling of secretions. Epiglottis: normal. No edema or lesions. Aryepiglottic folds: normal. No lesions. Vocal cords: clear and mobile. No lesions. Airway patent. Arytenoids: no edema or erythema. Interarytenoid area: no edema, erythema or lesion.  Tolerated the procedure well.

## 2024-05-14 NOTE — ASSESSMENT
[FreeTextEntry1] : My differential diagnosis includes but is not limited to LPR or musculoskeletal pathology. Patient education material for LPR was provided. Will recommend famotidine 40 mg twice daily. I have also emphasized importance of diet lifestyle modifications. Will see him in follow-up in 6 weeks.

## 2024-05-14 NOTE — HISTORY OF PRESENT ILLNESS
[de-identified] : Initial visit, referred in consultation. His chief complaint is "sore throat". He reports this has been ongoing for 6 weeks. He begins his history by reporting that at the beginning of the year he was abroad.  Developed some throat symptoms.  When he was not improving was seen by Dr. Freeman and recommended Prevacid.  He took Prevacid for 5 days his symptoms resolved. At this point he is complaining of a low-grade sore throat. He also feels some slight discomfort in his right neck. Also feels some right ear discomfort. Has had orthodontic work but is not sure if he has bruxism. Has a previous history of smoking. Does not report change in voice, unexplained weight loss or hemoptysis.

## 2024-06-24 ENCOUNTER — APPOINTMENT (OUTPATIENT)
Dept: OTOLARYNGOLOGY | Facility: CLINIC | Age: 59
End: 2024-06-24
Payer: COMMERCIAL

## 2024-06-24 DIAGNOSIS — K21.9 GASTRO-ESOPHAGEAL REFLUX DISEASE W/OUT ESOPHAGITIS: ICD-10-CM

## 2024-06-24 PROCEDURE — 99212 OFFICE O/P EST SF 10 MIN: CPT

## 2024-06-27 PROBLEM — K21.9 LPRD (LARYNGOPHARYNGEAL REFLUX DISEASE): Status: ACTIVE | Noted: 2024-06-27

## 2024-07-29 ENCOUNTER — RX RENEWAL (OUTPATIENT)
Age: 59
End: 2024-07-29

## 2024-08-15 ENCOUNTER — APPOINTMENT (OUTPATIENT)
Dept: ENDOCRINOLOGY | Facility: CLINIC | Age: 59
End: 2024-08-15
Payer: COMMERCIAL

## 2024-08-15 VITALS
DIASTOLIC BLOOD PRESSURE: 73 MMHG | HEIGHT: 71 IN | HEART RATE: 66 BPM | WEIGHT: 190 LBS | SYSTOLIC BLOOD PRESSURE: 128 MMHG | BODY MASS INDEX: 26.6 KG/M2

## 2024-08-15 LAB
GLUCOSE BLDC GLUCOMTR-MCNC: 187
HBA1C MFR BLD HPLC: 7

## 2024-08-15 PROCEDURE — 82962 GLUCOSE BLOOD TEST: CPT

## 2024-08-15 PROCEDURE — 99213 OFFICE O/P EST LOW 20 MIN: CPT

## 2024-08-15 PROCEDURE — 83036 HEMOGLOBIN GLYCOSYLATED A1C: CPT | Mod: QW

## 2024-08-15 PROCEDURE — G2211 COMPLEX E/M VISIT ADD ON: CPT | Mod: NC

## 2024-08-15 RX ORDER — REPAGLINIDE 1 MG/1
1 TABLET ORAL
Qty: 90 | Refills: 1 | Status: ACTIVE | COMMUNITY
Start: 2024-08-15 | End: 1900-01-01

## 2024-08-15 NOTE — HISTORY OF PRESENT ILLNESS
[FreeTextEntry1] : Sugars have improved.  A1c with PCP was 6.7% in June. He significantly reduced his alcohol intake. Sugars are still in high in the mornings, 160-170s due to eating late.   After lunch/mid day:  150s. He could not tolerate Jardiance due to polyuria, feeling giddy, lightheaded, and pioglitazone, due to head feeling heavy, didn't like feeling when taking it. He walks every day but has not gone back to the gym since his umbilical hernia surgery one year ago, but he plans to go back b/c he's fine now. last ophtho visit, 1/24:  no DR labs reviewed from 6/24:  A1c 6.7%, TSH 1.60 A1c today is 7.0%  PMH: CAD s/p stent 11/2019 (found after experiencing chest pain radiating to L arm while walking to work)  Meds: metformin 850mg bid losartan 100mg, chlorthalidone 25mg, atenolol 12.5mg Klorcon atorvastatin 40mg (? or 80) aspirin 81mg Previous meds:  pioglitazone (heavy head), Jardiance (giddy, polyuria, lightheaded)

## 2024-08-15 NOTE — PHYSICAL EXAM
[Alert] : alert [Healthy Appearance] : healthy appearance [Normal Sensation on Monofilament Testing] : normal sensation on monofilament testing of lower extremities [Normal Affect] : the affect was normal [Normal Mood] : the mood was normal [Acanthosis Nigricans] : no acanthosis nigricans [Foot Ulcers] : no foot ulcers [de-identified] : glucose 187, 1.5hpp (fruit, dry cereal) [de-identified] : no onychomycoses

## 2024-08-15 NOTE — DATA REVIEWED
[FreeTextEntry1] : 8/24  A1c 7.0% 4/24  A1c 8.5% 1/24  A1c 8.1%  tot chol 105, trig 127, HDL 46, LDL 37, TSH 1.55 6/23  A1c 6.4%  tot chol 120, trig 351, HDL 38, LDL 12 1/23  A1c 6.5%, tot chol 201, trig 260, HDL 41, , TSH 1.29, 25D 26.8 11/22  A1c 6.8%, tot chol 202, trig 312, HDL 40, LDL 99, TSH 1.46, B12 532 7/22  A1c 6.2% 10/21 A1c 5.6%, tot chol 107,  HDL 39,  LDL 53, trig 125, TSH 1.73 6/21: A1c 5.7%, tot chol 115, trig 120, HDL 40, LDL 52,  B12 521, urine microalbumin < 1.2 2/21: A1c 8.0%, tot chol 122, HDL 32, LDL 54, trig 222, TSH 2.31, GFR 74

## 2024-08-15 NOTE — ASSESSMENT
[FreeTextEntry1] : Diabetes, at goal (goal A1c < 7% (or < 6.5 would be even better).  Mixed hyperlipidemia. Continue metformin 850mg bid. I recommended adding repaglinide 1mg to take when eating out, or eating carbs (rice, pasta). I recommended trying to eat earlier, especially carbs foods or desserts. continue monitoring sugars at home. continue statin therapy.  due for urine albumin RTO 4 months

## 2024-08-16 LAB
CREAT SPEC-SCNC: 42 MG/DL
MICROALBUMIN 24H UR DL<=1MG/L-MCNC: <1.2 MG/DL
MICROALBUMIN/CREAT 24H UR-RTO: NORMAL MG/G

## 2024-09-16 ENCOUNTER — RX RENEWAL (OUTPATIENT)
Age: 59
End: 2024-09-16

## 2024-12-04 ENCOUNTER — APPOINTMENT (OUTPATIENT)
Dept: ENDOCRINOLOGY | Facility: CLINIC | Age: 59
End: 2024-12-04
Payer: COMMERCIAL

## 2024-12-04 VITALS
BODY MASS INDEX: 26.6 KG/M2 | HEIGHT: 71 IN | HEART RATE: 63 BPM | DIASTOLIC BLOOD PRESSURE: 74 MMHG | WEIGHT: 190 LBS | SYSTOLIC BLOOD PRESSURE: 131 MMHG

## 2024-12-04 LAB
GLUCOSE BLDC GLUCOMTR-MCNC: 171
HBA1C MFR BLD HPLC: 6.9

## 2024-12-04 PROCEDURE — 99214 OFFICE O/P EST MOD 30 MIN: CPT | Mod: 25

## 2024-12-04 PROCEDURE — 83036 HEMOGLOBIN GLYCOSYLATED A1C: CPT | Mod: QW

## 2024-12-04 PROCEDURE — 82962 GLUCOSE BLOOD TEST: CPT

## 2025-01-24 ENCOUNTER — APPOINTMENT (OUTPATIENT)
Dept: OPHTHALMOLOGY | Facility: CLINIC | Age: 60
End: 2025-01-24

## 2025-01-24 ENCOUNTER — NON-APPOINTMENT (OUTPATIENT)
Age: 60
End: 2025-01-24

## 2025-01-24 PROCEDURE — 92014 COMPRE OPH EXAM EST PT 1/>: CPT

## 2025-01-24 PROCEDURE — 92201 OPSCPY EXTND RTA DRAW UNI/BI: CPT

## 2025-01-24 PROCEDURE — 92134 CPTRZ OPH DX IMG PST SGM RTA: CPT

## 2025-02-07 ENCOUNTER — APPOINTMENT (OUTPATIENT)
Dept: CT IMAGING | Facility: CLINIC | Age: 60
End: 2025-02-07
Payer: COMMERCIAL

## 2025-02-07 ENCOUNTER — OUTPATIENT (OUTPATIENT)
Dept: OUTPATIENT SERVICES | Facility: HOSPITAL | Age: 60
LOS: 1 days | End: 2025-02-07

## 2025-02-07 PROCEDURE — 74177 CT ABD & PELVIS W/CONTRAST: CPT | Mod: 26

## 2025-04-03 ENCOUNTER — APPOINTMENT (OUTPATIENT)
Dept: OPHTHALMOLOGY | Facility: CLINIC | Age: 60
End: 2025-04-03
Payer: COMMERCIAL

## 2025-04-03 ENCOUNTER — NON-APPOINTMENT (OUTPATIENT)
Age: 60
End: 2025-04-03

## 2025-04-03 PROCEDURE — 67105 REPAIR DETACHED RETINA PC: CPT | Mod: RT

## 2025-04-03 PROCEDURE — 92134 CPTRZ OPH DX IMG PST SGM RTA: CPT

## 2025-04-03 PROCEDURE — 92012 INTRM OPH EXAM EST PATIENT: CPT | Mod: 25

## 2025-04-16 ENCOUNTER — APPOINTMENT (OUTPATIENT)
Dept: OPHTHALMOLOGY | Facility: CLINIC | Age: 60
End: 2025-04-16
Payer: COMMERCIAL

## 2025-04-16 ENCOUNTER — NON-APPOINTMENT (OUTPATIENT)
Age: 60
End: 2025-04-16

## 2025-04-16 PROCEDURE — 92134 CPTRZ OPH DX IMG PST SGM RTA: CPT

## 2025-04-16 PROCEDURE — 92201 OPSCPY EXTND RTA DRAW UNI/BI: CPT

## 2025-04-16 PROCEDURE — 92012 INTRM OPH EXAM EST PATIENT: CPT

## 2025-04-22 ENCOUNTER — RX RENEWAL (OUTPATIENT)
Age: 60
End: 2025-04-22

## 2025-05-01 ENCOUNTER — RX RENEWAL (OUTPATIENT)
Age: 60
End: 2025-05-01

## 2025-06-04 ENCOUNTER — APPOINTMENT (OUTPATIENT)
Dept: ENDOCRINOLOGY | Facility: CLINIC | Age: 60
End: 2025-06-04
Payer: COMMERCIAL

## 2025-06-04 VITALS
WEIGHT: 192 LBS | SYSTOLIC BLOOD PRESSURE: 133 MMHG | HEIGHT: 71 IN | BODY MASS INDEX: 26.88 KG/M2 | DIASTOLIC BLOOD PRESSURE: 82 MMHG | HEART RATE: 54 BPM

## 2025-06-04 DIAGNOSIS — E11.9 TYPE 2 DIABETES MELLITUS W/OUT COMPLICATIONS: ICD-10-CM

## 2025-06-04 LAB
GLUCOSE BLDC GLUCOMTR-MCNC: 166
HBA1C MFR BLD HPLC: 7.4

## 2025-06-04 PROCEDURE — 99214 OFFICE O/P EST MOD 30 MIN: CPT | Mod: 25

## 2025-06-04 PROCEDURE — 82962 GLUCOSE BLOOD TEST: CPT

## 2025-06-04 PROCEDURE — 83036 HEMOGLOBIN GLYCOSYLATED A1C: CPT | Mod: QW

## 2025-06-04 RX ORDER — SITAGLIPTIN 100 MG/1
100 TABLET, FILM COATED ORAL DAILY
Qty: 90 | Refills: 1 | Status: ACTIVE | COMMUNITY
Start: 2025-06-04 | End: 1900-01-01